# Patient Record
Sex: FEMALE | URBAN - METROPOLITAN AREA
[De-identification: names, ages, dates, MRNs, and addresses within clinical notes are randomized per-mention and may not be internally consistent; named-entity substitution may affect disease eponyms.]

---

## 2024-11-26 ENCOUNTER — OFFICE (OUTPATIENT)
Age: 19
End: 2024-11-26
Payer: COMMERCIAL

## 2024-11-26 ENCOUNTER — OFFICE (OUTPATIENT)
Dept: URBAN - METROPOLITAN AREA CLINIC 76 | Facility: CLINIC | Age: 19
End: 2024-11-26
Payer: COMMERCIAL

## 2024-11-26 VITALS
WEIGHT: 120 LBS | HEART RATE: 81 BPM | OXYGEN SATURATION: 99 % | OXYGEN SATURATION: 99 % | HEIGHT: 62 IN | HEIGHT: 62 IN | HEIGHT: 62 IN | OXYGEN SATURATION: 99 % | DIASTOLIC BLOOD PRESSURE: 70 MMHG | WEIGHT: 120 LBS | HEIGHT: 62 IN | HEART RATE: 81 BPM | DIASTOLIC BLOOD PRESSURE: 70 MMHG | HEIGHT: 62 IN | WEIGHT: 120 LBS | OXYGEN SATURATION: 99 % | WEIGHT: 120 LBS | SYSTOLIC BLOOD PRESSURE: 108 MMHG | OXYGEN SATURATION: 99 % | WEIGHT: 120 LBS | HEIGHT: 62 IN | SYSTOLIC BLOOD PRESSURE: 108 MMHG | DIASTOLIC BLOOD PRESSURE: 70 MMHG | SYSTOLIC BLOOD PRESSURE: 108 MMHG | DIASTOLIC BLOOD PRESSURE: 70 MMHG | SYSTOLIC BLOOD PRESSURE: 108 MMHG | DIASTOLIC BLOOD PRESSURE: 70 MMHG | DIASTOLIC BLOOD PRESSURE: 70 MMHG | HEART RATE: 81 BPM | OXYGEN SATURATION: 99 % | HEART RATE: 81 BPM | WEIGHT: 120 LBS | SYSTOLIC BLOOD PRESSURE: 108 MMHG | HEART RATE: 81 BPM | SYSTOLIC BLOOD PRESSURE: 108 MMHG | HEART RATE: 81 BPM | WEIGHT: 120 LBS | DIASTOLIC BLOOD PRESSURE: 70 MMHG | OXYGEN SATURATION: 99 % | HEART RATE: 81 BPM | HEIGHT: 62 IN | SYSTOLIC BLOOD PRESSURE: 108 MMHG

## 2024-11-26 DIAGNOSIS — R10.84 GENERALIZED ABDOMINAL PAIN: ICD-10-CM

## 2024-11-26 DIAGNOSIS — K60.2 ANAL FISSURE, UNSPECIFIED: ICD-10-CM

## 2024-11-26 DIAGNOSIS — R19.7 DIARRHEA, UNSPECIFIED: ICD-10-CM

## 2024-11-26 DIAGNOSIS — R19.5 OTHER FECAL ABNORMALITIES: ICD-10-CM

## 2024-11-26 DIAGNOSIS — K64.9 UNSPECIFIED HEMORRHOIDS: ICD-10-CM

## 2024-11-26 DIAGNOSIS — R11.0 NAUSEA: ICD-10-CM

## 2024-11-26 PROCEDURE — 99204 OFFICE O/P NEW MOD 45 MIN: CPT | Performed by: INTERNAL MEDICINE

## 2024-11-26 RX ORDER — OMEPRAZOLE 20 MG/1
20 CAPSULE, DELAYED RELEASE ORAL
Qty: 30 | Refills: 3 | Status: ACTIVE
Start: 2024-11-26

## 2024-12-17 VITALS
RESPIRATION RATE: 25 BRPM | SYSTOLIC BLOOD PRESSURE: 126 MMHG | DIASTOLIC BLOOD PRESSURE: 70 MMHG | DIASTOLIC BLOOD PRESSURE: 61 MMHG | OXYGEN SATURATION: 99 % | DIASTOLIC BLOOD PRESSURE: 75 MMHG | RESPIRATION RATE: 16 BRPM | RESPIRATION RATE: 17 BRPM | RESPIRATION RATE: 33 BRPM | HEIGHT: 62 IN | SYSTOLIC BLOOD PRESSURE: 100 MMHG | SYSTOLIC BLOOD PRESSURE: 110 MMHG | HEART RATE: 95 BPM | RESPIRATION RATE: 30 BRPM | HEART RATE: 69 BPM | HEART RATE: 75 BPM | HEART RATE: 86 BPM | WEIGHT: 110 LBS | HEART RATE: 101 BPM | OXYGEN SATURATION: 98 % | RESPIRATION RATE: 4 BRPM | SYSTOLIC BLOOD PRESSURE: 93 MMHG | RESPIRATION RATE: 18 BRPM | OXYGEN SATURATION: 97 % | DIASTOLIC BLOOD PRESSURE: 67 MMHG | SYSTOLIC BLOOD PRESSURE: 115 MMHG | DIASTOLIC BLOOD PRESSURE: 55 MMHG | SYSTOLIC BLOOD PRESSURE: 124 MMHG | SYSTOLIC BLOOD PRESSURE: 99 MMHG | DIASTOLIC BLOOD PRESSURE: 74 MMHG | TEMPERATURE: 97.4 F | OXYGEN SATURATION: 95 % | SYSTOLIC BLOOD PRESSURE: 108 MMHG | RESPIRATION RATE: 35 BRPM | DIASTOLIC BLOOD PRESSURE: 51 MMHG | RESPIRATION RATE: 7 BRPM | DIASTOLIC BLOOD PRESSURE: 53 MMHG | HEART RATE: 97 BPM | SYSTOLIC BLOOD PRESSURE: 103 MMHG | SYSTOLIC BLOOD PRESSURE: 111 MMHG | DIASTOLIC BLOOD PRESSURE: 71 MMHG | TEMPERATURE: 96.7 F | HEART RATE: 98 BPM | HEART RATE: 87 BPM | HEART RATE: 96 BPM | HEART RATE: 85 BPM | DIASTOLIC BLOOD PRESSURE: 56 MMHG | HEART RATE: 110 BPM | OXYGEN SATURATION: 100 % | HEART RATE: 90 BPM

## 2024-12-27 ENCOUNTER — AMBULATORY SURGICAL CENTER (AMBULATORY)
Dept: URBAN - METROPOLITAN AREA SURGERY 17 | Facility: SURGERY | Age: 19
End: 2024-12-27
Payer: COMMERCIAL

## 2024-12-27 ENCOUNTER — OFFICE (AMBULATORY)
Dept: URBAN - METROPOLITAN AREA PATHOLOGY 4 | Facility: PATHOLOGY | Age: 19
End: 2024-12-27
Payer: COMMERCIAL

## 2024-12-27 DIAGNOSIS — R19.7 DIARRHEA, UNSPECIFIED: ICD-10-CM

## 2024-12-27 DIAGNOSIS — R10.84 GENERALIZED ABDOMINAL PAIN: ICD-10-CM

## 2024-12-27 DIAGNOSIS — R19.5 OTHER FECAL ABNORMALITIES: ICD-10-CM

## 2024-12-27 DIAGNOSIS — R11.0 NAUSEA: ICD-10-CM

## 2024-12-27 DIAGNOSIS — K64.1 SECOND DEGREE HEMORRHOIDS: ICD-10-CM

## 2024-12-27 DIAGNOSIS — K21.00 GASTRO-ESOPHAGEAL REFLUX DISEASE WITH ESOPHAGITIS, WITHOUT B: ICD-10-CM

## 2024-12-27 DIAGNOSIS — K31.89 OTHER DISEASES OF STOMACH AND DUODENUM: ICD-10-CM

## 2024-12-27 DIAGNOSIS — K21.01 GASTRO-ESOPHAGEAL REFLUX DISEASE WITH ESOPHAGITIS, WITH BLEE: ICD-10-CM

## 2024-12-27 DIAGNOSIS — K60.2 ANAL FISSURE, UNSPECIFIED: ICD-10-CM

## 2024-12-27 PROBLEM — K20.81 OTHER ESOPHAGITIS WITH BLEEDING: Status: ACTIVE | Noted: 2024-12-27

## 2024-12-27 PROBLEM — K20.91 ESOPHAGITIS, UNSPECIFIED WITH BLEEDING: Status: ACTIVE | Noted: 2024-12-27

## 2024-12-27 LAB
GI HISTOLOGY: A. DUODENUM: (no result)
GI HISTOLOGY: B. STOMACH ANTRUM: (no result)
GI HISTOLOGY: D. TERMINAL ILEUM: (no result)
GI HISTOLOGY: E. RANDOM RIGHT COLON: (no result)
GI HISTOLOGY: F. RANDOM LEFT COLON: (no result)
GI HISTOLOGY: PDF REPORT: (no result)
Lab: (no result)

## 2024-12-27 PROCEDURE — 88342 IMHCHEM/IMCYTCHM 1ST ANTB: CPT | Performed by: PATHOLOGY

## 2024-12-27 PROCEDURE — 88305 TISSUE EXAM BY PATHOLOGIST: CPT | Performed by: PATHOLOGY

## 2024-12-27 PROCEDURE — 43239 EGD BIOPSY SINGLE/MULTIPLE: CPT | Performed by: INTERNAL MEDICINE

## 2024-12-27 PROCEDURE — 45380 COLONOSCOPY AND BIOPSY: CPT | Performed by: INTERNAL MEDICINE

## 2024-12-27 RX ORDER — OMEPRAZOLE 20 MG/1
40 CAPSULE, DELAYED RELEASE ORAL
Qty: 30 | Refills: 3 | Status: ACTIVE
Start: 2024-11-26

## 2024-12-27 NOTE — SERVICEHPINOTES
Ms. Brian Lozoya is a 19-year-old female with no significant past medical history presents for nausea, vomiting, abdominal pain, and diarrheaMother at bedside, collaborated the storyShe reports symptoms of fissure and hemorrhoids examined done by her PCP, she uses OTC creams. She reports abdominal pain started past 6 months ago, tightness around her entire abdomen, worsen in the lower pelvis. She sometimes feel like it is gas. She develop chills. She had blood clot in her stools. She has seen bright red blood in her stools and in the toilet bowel. She reports approximately 2 bowel movement per day after dinner. [place] stool scale of 4 or 5. brNo fevers or chills. No chest or palpation. No weight loss. She gaining weight. She strain sometimes to have bowel movement.
br
br 12.27.2024 Interval history: patient is here for colonoscopy. She reports vomiting blood, it has now stop. She is still having rectal bleeding. Social hx: No tobacco or vaping, alcohol, or illicit brFamily history: no inflammatory bowel disease or colorectal cancer. Grandmother with uterine cancer. Data reviewed:brHemoglobin 14, MCV 83, platelets 271, celiac panel unremarkable, IgA 39br1/24/2024 CT liquid stools within the distal colon and rectum.brED visit 1/23/2024 for abdominal pain, nausea, vomiting diarrhea.
br br

## 2025-02-28 ENCOUNTER — OFFICE (AMBULATORY)
Dept: URBAN - METROPOLITAN AREA CLINIC 76 | Facility: CLINIC | Age: 20
End: 2025-02-28
Payer: COMMERCIAL

## 2025-02-28 VITALS
WEIGHT: 110 LBS | HEART RATE: 77 BPM | HEIGHT: 62 IN | SYSTOLIC BLOOD PRESSURE: 113 MMHG | DIASTOLIC BLOOD PRESSURE: 74 MMHG

## 2025-02-28 DIAGNOSIS — K21.00 GASTRO-ESOPHAGEAL REFLUX DISEASE WITH ESOPHAGITIS, WITHOUT B: ICD-10-CM

## 2025-02-28 DIAGNOSIS — K64.8 OTHER HEMORRHOIDS: ICD-10-CM

## 2025-02-28 PROCEDURE — 99214 OFFICE O/P EST MOD 30 MIN: CPT | Performed by: INTERNAL MEDICINE

## 2025-02-28 RX ORDER — OMEPRAZOLE 40 MG/1
40 CAPSULE, DELAYED RELEASE ORAL
Qty: 120 | Refills: 1 | Status: ACTIVE
Start: 2025-02-28

## 2025-05-05 VITALS
DIASTOLIC BLOOD PRESSURE: 82 MMHG | OXYGEN SATURATION: 100 % | WEIGHT: 110 LBS | DIASTOLIC BLOOD PRESSURE: 77 MMHG | HEART RATE: 86 BPM | RESPIRATION RATE: 11 BRPM | SYSTOLIC BLOOD PRESSURE: 135 MMHG | SYSTOLIC BLOOD PRESSURE: 97 MMHG | OXYGEN SATURATION: 97 % | TEMPERATURE: 97.9 F | TEMPERATURE: 97.3 F | DIASTOLIC BLOOD PRESSURE: 79 MMHG | HEART RATE: 62 BPM | HEART RATE: 69 BPM | HEART RATE: 66 BPM | RESPIRATION RATE: 12 BRPM | HEART RATE: 73 BPM | RESPIRATION RATE: 16 BRPM | SYSTOLIC BLOOD PRESSURE: 124 MMHG | OXYGEN SATURATION: 99 % | SYSTOLIC BLOOD PRESSURE: 92 MMHG | SYSTOLIC BLOOD PRESSURE: 113 MMHG | RESPIRATION RATE: 15 BRPM | DIASTOLIC BLOOD PRESSURE: 57 MMHG | HEART RATE: 81 BPM | SYSTOLIC BLOOD PRESSURE: 117 MMHG | HEIGHT: 62 IN

## 2025-05-07 ENCOUNTER — AMBULATORY SURGICAL CENTER (AMBULATORY)
Dept: URBAN - METROPOLITAN AREA SURGERY 17 | Facility: SURGERY | Age: 20
End: 2025-05-07
Payer: COMMERCIAL

## 2025-05-07 ENCOUNTER — OFFICE (AMBULATORY)
Dept: URBAN - METROPOLITAN AREA PATHOLOGY 4 | Facility: PATHOLOGY | Age: 20
End: 2025-05-07
Payer: COMMERCIAL

## 2025-05-07 DIAGNOSIS — K21.00 GASTRO-ESOPHAGEAL REFLUX DISEASE WITH ESOPHAGITIS, WITHOUT B: ICD-10-CM

## 2025-05-07 LAB
GI HISTOLOGY: PDF REPORT: (no result)
Lab: (no result)

## 2025-05-07 PROCEDURE — 43239 EGD BIOPSY SINGLE/MULTIPLE: CPT | Performed by: INTERNAL MEDICINE

## 2025-05-07 PROCEDURE — 88305 TISSUE EXAM BY PATHOLOGIST: CPT | Performed by: PATHOLOGY

## 2025-05-07 RX ORDER — OMEPRAZOLE 20 MG/1
20 CAPSULE, DELAYED RELEASE ORAL
Qty: 90 | Refills: 3 | Status: ACTIVE
Start: 2025-05-07

## 2025-08-13 ENCOUNTER — OFFICE (AMBULATORY)
Dept: URBAN - METROPOLITAN AREA CLINIC 76 | Facility: CLINIC | Age: 20
End: 2025-08-13
Payer: COMMERCIAL

## 2025-08-13 VITALS
SYSTOLIC BLOOD PRESSURE: 111 MMHG | WEIGHT: 120 LBS | HEART RATE: 75 BPM | HEIGHT: 62 IN | DIASTOLIC BLOOD PRESSURE: 76 MMHG | OXYGEN SATURATION: 99 %

## 2025-08-13 DIAGNOSIS — K21.01 GASTRO-ESOPHAGEAL REFLUX DISEASE WITH ESOPHAGITIS, WITH BLEE: ICD-10-CM

## 2025-08-13 DIAGNOSIS — R11.0 NAUSEA: ICD-10-CM

## 2025-08-13 DIAGNOSIS — R10.84 GENERALIZED ABDOMINAL PAIN: ICD-10-CM

## 2025-08-13 PROBLEM — K20.91 ESOPHAGITIS, UNSPECIFIED WITH BLEEDING: Status: ACTIVE | Noted: 2024-12-27

## 2025-08-13 PROCEDURE — 99214 OFFICE O/P EST MOD 30 MIN: CPT | Performed by: INTERNAL MEDICINE

## 2025-08-13 RX ORDER — OMEPRAZOLE 40 MG/1
40 CAPSULE, DELAYED RELEASE ORAL
Qty: 90 | Refills: 3 | Status: ACTIVE
Start: 2025-05-07